# Patient Record
Sex: FEMALE | Race: WHITE | NOT HISPANIC OR LATINO | URBAN - METROPOLITAN AREA
[De-identification: names, ages, dates, MRNs, and addresses within clinical notes are randomized per-mention and may not be internally consistent; named-entity substitution may affect disease eponyms.]

---

## 2019-01-03 ENCOUNTER — TELEPHONE (OUTPATIENT)
Dept: BARIATRICS | Facility: CLINIC | Age: 69
End: 2019-01-03

## 2019-01-03 DIAGNOSIS — K91.2 POSTSURGICAL MALABSORPTION: Primary | ICD-10-CM

## 2019-01-03 DIAGNOSIS — Z98.84 BARIATRIC SURGERY STATUS: ICD-10-CM

## 2023-03-20 PROBLEM — M15.9 PRIMARY OSTEOARTHRITIS INVOLVING MULTIPLE JOINTS: Status: ACTIVE | Noted: 2022-11-14

## 2023-03-20 PROBLEM — H81.10 VERTIGO, BENIGN PAROXYSMAL: Status: ACTIVE | Noted: 2017-01-13

## 2023-03-20 PROBLEM — K76.0 FATTY LIVER: Status: ACTIVE | Noted: 2020-07-26

## 2023-03-20 PROBLEM — I10 ESSENTIAL HYPERTENSION: Status: ACTIVE | Noted: 2020-07-26

## 2023-03-20 PROBLEM — R94.31 PROLONGED QT INTERVAL: Status: ACTIVE | Noted: 2020-07-26

## 2023-03-20 PROBLEM — Z53.20 REFUSAL OF STATIN MEDICATION BY PATIENT: Status: ACTIVE | Noted: 2022-11-14

## 2023-03-20 PROBLEM — K91.2 POSTSURGICAL MALABSORPTION: Status: ACTIVE | Noted: 2022-11-14

## 2023-03-20 PROBLEM — E61.1 IRON DEFICIENCY: Status: ACTIVE | Noted: 2022-11-14

## 2023-03-20 PROBLEM — E78.5 BORDERLINE HYPERLIPIDEMIA: Status: ACTIVE | Noted: 2022-11-14

## 2023-03-20 PROBLEM — E55.9 VITAMIN D DEFICIENCY: Status: ACTIVE | Noted: 2022-11-14

## 2023-03-20 PROBLEM — Z98.84 STATUS POST BARIATRIC SURGERY: Status: ACTIVE | Noted: 2022-11-14

## 2023-03-20 PROBLEM — M54.50 RECURRENT LOW BACK PAIN: Status: ACTIVE | Noted: 2022-11-14

## 2023-05-17 RX ORDER — SODIUM CHLORIDE 9 MG/ML
125 INJECTION, SOLUTION INTRAVENOUS CONTINUOUS
Status: CANCELLED | OUTPATIENT
Start: 2023-05-17

## 2023-05-19 ENCOUNTER — ANESTHESIA EVENT (OUTPATIENT)
Dept: GASTROENTEROLOGY | Facility: HOSPITAL | Age: 73
End: 2023-05-19

## 2023-05-19 ENCOUNTER — HOSPITAL ENCOUNTER (OUTPATIENT)
Dept: GASTROENTEROLOGY | Facility: HOSPITAL | Age: 73
Setting detail: OUTPATIENT SURGERY
End: 2023-05-19
Attending: INTERNAL MEDICINE

## 2023-05-19 ENCOUNTER — ANESTHESIA (OUTPATIENT)
Dept: GASTROENTEROLOGY | Facility: HOSPITAL | Age: 73
End: 2023-05-19

## 2023-05-19 VITALS
SYSTOLIC BLOOD PRESSURE: 115 MMHG | RESPIRATION RATE: 17 BRPM | DIASTOLIC BLOOD PRESSURE: 58 MMHG | OXYGEN SATURATION: 97 % | HEART RATE: 63 BPM

## 2023-05-19 DIAGNOSIS — Z12.11 SCREENING FOR COLON CANCER: ICD-10-CM

## 2023-05-19 RX ORDER — PROPOFOL 10 MG/ML
INJECTION, EMULSION INTRAVENOUS AS NEEDED
Status: DISCONTINUED | OUTPATIENT
Start: 2023-05-19 | End: 2023-05-19

## 2023-05-19 RX ORDER — SODIUM CHLORIDE 9 MG/ML
125 INJECTION, SOLUTION INTRAVENOUS CONTINUOUS
Status: DISCONTINUED | OUTPATIENT
Start: 2023-05-19 | End: 2023-05-23 | Stop reason: HOSPADM

## 2023-05-19 RX ADMIN — PROPOFOL 30 MG: 10 INJECTION, EMULSION INTRAVENOUS at 07:45

## 2023-05-19 RX ADMIN — PROPOFOL 50 MG: 10 INJECTION, EMULSION INTRAVENOUS at 07:54

## 2023-05-19 RX ADMIN — PROPOFOL 30 MG: 10 INJECTION, EMULSION INTRAVENOUS at 07:52

## 2023-05-19 RX ADMIN — PROPOFOL 40 MG: 10 INJECTION, EMULSION INTRAVENOUS at 07:40

## 2023-05-19 RX ADMIN — PROPOFOL 120 MG: 10 INJECTION, EMULSION INTRAVENOUS at 07:35

## 2023-05-19 RX ADMIN — PROPOFOL 30 MG: 10 INJECTION, EMULSION INTRAVENOUS at 07:50

## 2023-05-19 RX ADMIN — PROPOFOL 40 MG: 10 INJECTION, EMULSION INTRAVENOUS at 07:38

## 2023-05-19 RX ADMIN — PROPOFOL 30 MG: 10 INJECTION, EMULSION INTRAVENOUS at 07:48

## 2023-05-19 RX ADMIN — SODIUM CHLORIDE 125 ML/HR: 0.9 INJECTION, SOLUTION INTRAVENOUS at 07:12

## 2023-05-19 RX ADMIN — PROPOFOL 30 MG: 10 INJECTION, EMULSION INTRAVENOUS at 07:42

## 2023-05-19 NOTE — DISCHARGE INSTR - AVS FIRST PAGE
Please call [phone] with any problems  A large polyp was removed in pieces and I would like to revisualize this area in 6 months  My office will contact you to come back to the office to go over pathology which I suspect will be benign

## 2023-05-19 NOTE — INTERVAL H&P NOTE
H&P reviewed  After examining the patient I find no changes in the patients condition since the H&P had been written      Vitals:    05/19/23 0704   BP: 144/65   Pulse: 63   Resp: 18   SpO2: 97%

## 2023-05-19 NOTE — ANESTHESIA POSTPROCEDURE EVALUATION
Post-Op Assessment Note    CV Status:  Stable    Pain management: adequate     Mental Status:  Alert and awake   Hydration Status:  Euvolemic   PONV Controlled:  Controlled   Airway Patency:  Patent      Post Op Vitals Reviewed: Yes      Staff: Anesthesiologist         No notable events documented      BP      Temp      Pulse     Resp      SpO2      /58   Pulse 63   Resp 17   SpO2 97%

## 2023-05-19 NOTE — ANESTHESIA PREPROCEDURE EVALUATION
Procedure:  COLONOSCOPY    Relevant Problems   CARDIO   (+) Borderline hyperlipidemia   (+) Essential hypertension      GI/HEPATIC       (+) Status post bariatric surgery                    (+) hx bells palsy  Physical Exam    Airway    Mallampati score: II  TM Distance: >3 FB  Neck ROM: full     Dental       Cardiovascular  Rhythm: regular, Rate: normal,     Pulmonary  Breath sounds clear to auscultation,     Other Findings        Anesthesia Plan  ASA Score- 3     Anesthesia Type- general with ASA Monitors  Additional Monitors:   Airway Plan:           Plan Factors-    Chart reviewed  Existing labs reviewed  Induction- intravenous  Postoperative Plan-     Informed Consent- Anesthetic plan and risks discussed with patient

## 2023-06-22 PROBLEM — Z86.010 HX OF ADENOMATOUS POLYP OF COLON: Status: ACTIVE | Noted: 2023-05-19

## 2023-11-02 NOTE — H&P (VIEW-ONLY)
[facility] - Outpatient Note  [facility] 68 y.o. female MRN: [id number]  Unit/Bed#:  Encounter: [id number]          ASSESSMENT AND PLAN:       The patient is scheduled for colonoscopy at [facility] on 12/1/2023 due to piecemeal removal of sessile serrated adenoma polyp on 5/19/2023. Risks and benefits of the procedure including bleeding, perforation, or missed lesions were discussed and the patient has verbalized understanding and agrees to proceed. She will use a two day colon prep, Suprep, which she already has. 2.  The patient will take her evening dose of her irbesartan the evening prior to the procedure and will take chlorthalidone after the procedure. 3.  Although a follow up visit has not been scheduled, a final report and recommendations will follow the procedure. 1. Encounter for colonoscopy due to history of adenomatous colonic polyps    2. Encounter for colonoscopy following colon polyp removal          ______________________________________________________________________    HPI:  The patient presents to the office today for instructions for a colonoscopy which is scheduled at [facility] on 12/1/2023. Her last colonoscopy was in May 2023 and showed a 20 mm sessile serrated adenoma polyp in her descending colon which was partially removed in a piecemeal fashion and the site was tattooed. The bowel prep was inadequate. This time she will use a 2 day Suprep bowel prep. She is doing well and offers no complaints such as change in bowel habits, melena, hematochezia, abdominal pain, upper GI symptoms, or unintentional weight loss. It has been a pleasure seeing Jamal Carbajalty today in the office. REVIEW OF SYSTEMS:    CONSTITUTIONAL: Denies any fever, chills, rigors, and weight loss. HEENT: No earache or tinnitus. Denies hearing loss or visual disturbances. CARDIOVASCULAR: No chest pain or palpitations.    RESPIRATORY: Denies any cough, hemoptysis, shortness of breath or dyspnea on exertion. GASTROINTESTINAL: As noted in the History of Present Illness. GENITOURINARY: No problems with urination. Denies any hematuria or dysuria. NEUROLOGIC: No dizziness or vertigo, denies headaches. MUSCULOSKELETAL: Denies any muscle or joint pain. SKIN: Denies skin rashes or itching. ENDOCRINE: Denies excessive thirst. Denies intolerance to heat or cold. PSYCHOSOCIAL: Denies depression or anxiety. Denies any recent memory loss. Historical Information   Past Medical History:   Diagnosis Date    Anemia     De Los Santos's palsy     Colon polyp      Past Surgical History:   Procedure Laterality Date    CHOLECYSTECTOMY      COLONOSCOPY  05/24/2012    Prescreening Colonoscopy-Tiny Polyp    COLONOSCOPY W/ BIOPSIES  05/19/2023    [facility] MD.- Right colon polyp.  Bx: Sessile serrated lesion (sessile serrated adenoma)    GASTRECTOMY      KNEE ARTHROSCOPY       Social History   Social History     Substance and Sexual Activity   Alcohol Use Not Currently     Social History     Substance and Sexual Activity   Drug Use Never     Social History     Tobacco Use   Smoking Status Never   Smokeless Tobacco Never     Family History   Problem Relation Age of Onset    Hypertension Mother     Heart disease Mother     Breast cancer Mother     Hypertension Father     Heart disease Father        Meds/Allergies       Current Outpatient Medications:     Acetaminophen (TYLENOL ARTHRITIS PAIN PO)    chlorthalidone 25 mg tablet    Ferrous Sulfate (IRON PO)    irbesartan (AVAPRO) 300 mg tablet    Multiple Vitamin (MULTIVITAMIN PO)    VITAMIN D PO    Na Sulfate-K Sulfate-Mg Sulf (Suprep Bowel Prep Kit) 17.5-3.13-1.6 GM/177ML SOLN    Na Sulfate-K Sulfate-Mg Sulf (Suprep Bowel Prep Kit) 17.5-3.13-1.6 GM/177ML SOLN    Allergies   Allergen Reactions    Celecoxib Other (See Comments)     Leg swelling      Tetracycline Rash           Objective     Blood pressure 146/70, pulse 66, temperature 97.6 °F (36.4 °C), height 5' 4" (1.626 m), weight 109 kg (241 lb 3.2 oz), SpO2 97 %. Body mass index is 41.4 kg/m². PHYSICAL EXAM:      General Appearance:   Alert, cooperative, no distress   HEENT:   Normocephalic, atraumatic, anicteric. Neck:  Supple, symmetrical, trachea midline   Lungs:   Clear to auscultation bilaterally; no rales, rhonchi or wheezing; respirations unlabored    Heart[de-identified]   Regular rate and rhythm; no murmur, rub, or gallop. Abdomen:   Soft, non-tender, non-distended; normal bowel sounds   Genitalia:   Deferred    Rectal:   Deferred    Extremities:  No cyanosis, clubbing or edema        Skin:  No jaundice, rashes, or lesions          Lab Results:   No visits with results within 60 Day(s) from this visit. Latest known visit with results is:   Hospital Outpatient Visit on 05/19/2023   Component Date Value    Case Report 05/19/2023                      Value:Surgical Pathology Report                         Case: [id number]                                   Authorizing Provider:  Bianca Mercado MD  Collected:           05/19/2023 0754              Ordering Location:     [facility]        Received:            05/19/2023 [address] [facility]                                                          Pathologist:           Tanja Lyon DO                                                     Specimen:    Large Intestine, Right/Ascending Colon, Right colon polyp                                  Final Diagnosis 05/19/2023                      Value: This result contains rich text formatting which cannot be displayed here. Additional Information 05/19/2023                      Value: This result contains rich text formatting which cannot be displayed here.     Synoptic Checklist 05/19/2023                      Value:                            COLON/RECTUM POLYP FORM - GI - All Specimens :    Serrated Adenoma      Gross Description 05/19/2023                      Value: This result contains rich text formatting which cannot be displayed here.     Clinical Information 05/19/2023                      Value:Hot snare

## 2023-11-29 RX ORDER — SODIUM CHLORIDE 9 MG/ML
125 INJECTION, SOLUTION INTRAVENOUS CONTINUOUS
Status: CANCELLED | OUTPATIENT
Start: 2023-11-29

## 2023-12-01 ENCOUNTER — ANESTHESIA EVENT (OUTPATIENT)
Dept: GASTROENTEROLOGY | Facility: HOSPITAL | Age: 73
End: 2023-12-01

## 2023-12-01 ENCOUNTER — HOSPITAL ENCOUNTER (OUTPATIENT)
Dept: GASTROENTEROLOGY | Facility: HOSPITAL | Age: 73
Setting detail: OUTPATIENT SURGERY
End: 2023-12-01
Attending: INTERNAL MEDICINE
Payer: MEDICARE

## 2023-12-01 ENCOUNTER — ANESTHESIA (OUTPATIENT)
Dept: GASTROENTEROLOGY | Facility: HOSPITAL | Age: 73
End: 2023-12-01

## 2023-12-01 VITALS
HEART RATE: 67 BPM | SYSTOLIC BLOOD PRESSURE: 139 MMHG | DIASTOLIC BLOOD PRESSURE: 63 MMHG | RESPIRATION RATE: 15 BRPM | HEIGHT: 64 IN | WEIGHT: 237 LBS | OXYGEN SATURATION: 95 % | BODY MASS INDEX: 40.46 KG/M2 | TEMPERATURE: 97.7 F

## 2023-12-01 DIAGNOSIS — Z86.010 HISTORY OF COLON POLYPS: ICD-10-CM

## 2023-12-01 PROCEDURE — 88305 TISSUE EXAM BY PATHOLOGIST: CPT | Performed by: PATHOLOGY

## 2023-12-01 RX ORDER — LIDOCAINE HYDROCHLORIDE 10 MG/ML
INJECTION, SOLUTION EPIDURAL; INFILTRATION; INTRACAUDAL; PERINEURAL AS NEEDED
Status: DISCONTINUED | OUTPATIENT
Start: 2023-12-01 | End: 2023-12-01

## 2023-12-01 RX ORDER — PROPOFOL 10 MG/ML
INJECTION, EMULSION INTRAVENOUS AS NEEDED
Status: DISCONTINUED | OUTPATIENT
Start: 2023-12-01 | End: 2023-12-01

## 2023-12-01 RX ORDER — SODIUM CHLORIDE 9 MG/ML
125 INJECTION, SOLUTION INTRAVENOUS CONTINUOUS
Status: DISCONTINUED | OUTPATIENT
Start: 2023-12-01 | End: 2023-12-05 | Stop reason: HOSPADM

## 2023-12-01 RX ADMIN — LIDOCAINE HYDROCHLORIDE 100 MG: 10 INJECTION, SOLUTION EPIDURAL; INFILTRATION; INTRACAUDAL; PERINEURAL at 08:13

## 2023-12-01 RX ADMIN — SODIUM CHLORIDE 125 ML/HR: 0.9 INJECTION, SOLUTION INTRAVENOUS at 07:35

## 2023-12-01 RX ADMIN — PROPOFOL 30 MG: 10 INJECTION, EMULSION INTRAVENOUS at 08:17

## 2023-12-01 RX ADMIN — PROPOFOL 70 MG: 10 INJECTION, EMULSION INTRAVENOUS at 08:13

## 2023-12-01 RX ADMIN — PROPOFOL 30 MG: 10 INJECTION, EMULSION INTRAVENOUS at 08:23

## 2023-12-01 RX ADMIN — PROPOFOL 30 MG: 10 INJECTION, EMULSION INTRAVENOUS at 08:29

## 2023-12-01 NOTE — ANESTHESIA PREPROCEDURE EVALUATION
Procedure:  COLONOSCOPY    Relevant Problems   CARDIO   (+) Borderline hyperlipidemia   (+) Essential hypertension      GI/HEPATIC       (+) Status post bariatric surgery                    (+) hx bells palsy  Physical Exam    Airway    Mallampati score: II  TM Distance: >3 FB  Neck ROM: full     Dental   No notable dental hx     Cardiovascular  Rhythm: regular, Rate: normal, Cardiovascular exam normal    Pulmonary  Pulmonary exam normal Breath sounds clear to auscultation    Other Findings  post-pubertal.      Anesthesia Plan  ASA Score- 3     Anesthesia Type- IV sedation with anesthesia with ASA Monitors. Additional Monitors:     Airway Plan:     Comment: GA prn. Plan Factors-    Chart reviewed. Existing labs reviewed. Patient summary reviewed. Patient is not a current smoker. Patient not instructed to abstain from smoking on day of procedure. Patient did not smoke on day of surgery. There is medical exclusion for perioperative obstructive sleep apnea risk education. Induction- intravenous. Postoperative Plan-     Informed Consent- Anesthetic plan and risks discussed with patient. I personally reviewed this patient with the CRNA. Discussed and agreed on the Anesthesia Plan with the CRNA. Ladan Wood

## 2023-12-01 NOTE — INTERVAL H&P NOTE
H&P reviewed. After examining the patient I find no changes in the patients condition since the H&P had been written.     Vitals:    12/01/23 0732   BP: 168/73   Pulse: 68   Resp: 16   Temp: 97.7 °F (36.5 °C)   SpO2: 97%

## 2023-12-01 NOTE — ANESTHESIA POSTPROCEDURE EVALUATION
Post-Op Assessment Note    CV Status:  Stable    Pain management: adequate       Mental Status:  Alert and awake   Hydration Status:  Euvolemic   PONV Controlled:  Controlled   Airway Patency:  Patent     Post Op Vitals Reviewed: Yes    No anethesia notable event occurred.     Staff: Anesthesiologist, CRNA               BP      Temp      Pulse     Resp      SpO2      /63   Pulse 67   Temp 97.7 °F (36.5 °C) (Temporal)   Resp 15   Ht 5' 4" (1.626 m)   Wt 108 kg (237 lb)   SpO2 95%   BMI 40.68 kg/m²

## 2023-12-01 NOTE — DISCHARGE INSTR - AVS FIRST PAGE
Please call [phone] with any problems. I did not find any residual polyp although 2 smaller polyps were seen and removed. I would suggest repeat exam in 2 years and no later.   Please call us if any problems

## 2023-12-07 PROCEDURE — 88305 TISSUE EXAM BY PATHOLOGIST: CPT | Performed by: PATHOLOGY
